# Patient Record
(demographics unavailable — no encounter records)

---
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
## 2025-06-10 NOTE — PHYSICAL EXAM
[4 x 4] : 4 x 4  [Abdominal Pad] : Abdominal Pad [JVD] : no jugular venous distention  [Normal Thyroid] : the thyroid was normal [Normal Heart Sounds] : normal heart sounds [Normal Breath Sounds] : Normal breath sounds [Normal Rate and Rhythm] : normal rate and rhythm [Ankle Swelling (On Exam)] : present [Ankle Swelling On The Right] : mild [Abdomen Masses] : No abdominal massess [Tender] : nontender [Abdomen Tenderness] : ~T ~M No abdominal tenderness [Enlarged] : not enlarged [Alert] : alert [Oriented to Person] : oriented to person [Oriented to Time] : oriented to time [Oriented to Place] : oriented to place [Calm] : calm [de-identified] : adult WM, NAD, alert, Ox3. [FreeTextEntry1] : Right Leg proximal [FreeTextEntry2] : 8.2cm [FreeTextEntry4] : 0.1cm [FreeTextEntry3] : 7.8cm [de-identified] : Sanguinous  [de-identified] : Burn 6/5/25 [de-identified] : Defined margins with residual blistered skin  [de-identified] : Patient expressed comfort post application, Circ/Neuromuscular function WNL  [de-identified] : Silvadene [de-identified] :  Mechanically cleansed with NS 0.9%, Sterile Gauze  Kerlix [FreeTextEntry7] : Right Leg Distal Fluid filled Blister [FreeTextEntry9] : 7.1cm [FreeTextEntry8] : 4.5cm [de-identified] : Burn 6/5/25 [de-identified] : Patient expressed comfort post application, Circ/Neuromuscular function WNL  [de-identified] : No product [de-identified] :  Mechanically cleansed with NS 0.9%, Sterile Gauze  Kerlix [TWNoteComboBox3] : FT [TWNoteComboBox4] : Small [TWNoteComboBox5] : No [TWNoteComboBox6] : Diabetic [de-identified] : None [de-identified] : No [de-identified] : None [de-identified] : No [de-identified] : 100% [de-identified] : Ace wraps [de-identified] : Primary Dressing [de-identified] : Daily [de-identified] : None [de-identified] : No [de-identified] : Traumatic [de-identified] : Normal [de-identified] : No [de-identified] : None [de-identified] : Ace wraps [de-identified] : Daily [de-identified] : Primary Dressing

## 2025-06-10 NOTE — PLAN
[FreeTextEntry1] : ABHISHEK jackson qd f/u-1 wk get authorization for sharp debridement  time spent 30 mins.

## 2025-06-10 NOTE — ASSESSMENT
[Verbal] : Verbal [Written] : Written [Demo] : Demo [Patient] : Patient [Good - alert, interested, motivated] : Good - alert, interested, motivated [Verbalizes knowledge/Understanding] : Verbalizes knowledge/understanding [Dressing changes] : dressing changes [Skin Care] : skin care [Signs and symptoms of infection] : sign and symptoms of infection [Nutrition] : nutrition [How and When to Call] : how and when to call [Pain Management] : pain management [Patient responsibility to plan of care] : patient responsibility to plan of care [] : Yes [Stable] : stable [Home] : Home [Ambulatory] : Ambulatory [Not Applicable - Long Term Care/Home Health Agency] : Long Term Care/Home Health Agency: Not Applicable [Compression Therapy] : compression therapy [FreeTextEntry2] : Infection prevention wound care Maintain optimal Skin Integrity to high pressure areas. Compression therapy Nutrition and Wound Healing Elevation and low sodium compliance Pressure relief/Pressure redistribution     [FreeTextEntry4] : Patient will come to [facility] for dressing changes. Follow up daily for dressings changes, 1 week for an assessment. [FreeTextEntry3] : Initial Visit

## 2025-06-24 NOTE — ASSESSMENT
[Verbal] : Verbal [Written] : Written [Demo] : Demo [Patient] : Patient [Good - alert, interested, motivated] : Good - alert, interested, motivated [Verbalizes knowledge/Understanding] : Verbalizes knowledge/understanding [Dressing changes] : dressing changes [Skin Care] : skin care [Signs and symptoms of infection] : sign and symptoms of infection [Nutrition] : nutrition [How and When to Call] : how and when to call [Pain Management] : pain management [Compression Therapy] : compression therapy [Patient responsibility to plan of care] : patient responsibility to plan of care [Stable] : stable [Home] : Home [Ambulatory] : Ambulatory [Not Applicable - Long Term Care/Home Health Agency] : Long Term Care/Home Health Agency: Not Applicable [] : Yes [FreeTextEntry2] : Infection prevention wound care Maintain optimal Skin Integrity to high pressure areas. Compression therapy Nutrition and Wound Healing Elevation and low sodium compliance Pressure relief/Pressure redistribution     [FreeTextEntry4] : Patient will come to [facility] for dressing changes Follow up daily for dressings changes, 1 week for an assessment.
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
## 2025-07-01 NOTE — PHYSICAL EXAM
[4 x 4] : 4 x 4  [Abdominal Pad] : Abdominal Pad [JVD] : no jugular venous distention  [Normal Thyroid] : the thyroid was normal [Normal Breath Sounds] : Normal breath sounds [Normal Heart Sounds] : normal heart sounds [Normal Rate and Rhythm] : normal rate and rhythm [Abdomen Masses] : No abdominal massess [Abdomen Tenderness] : ~T ~M No abdominal tenderness [Tender] : nontender [Enlarged] : not enlarged [Alert] : alert [Oriented to Person] : oriented to person [Oriented to Place] : oriented to place [Oriented to Time] : oriented to time [Calm] : calm [de-identified] : adult Wm, NAD, alert, Ox3. [FreeTextEntry1] : Right Leg Proximal (includes wound measurement) [FreeTextEntry2] : 15.0 [FreeTextEntry3] : 9.2 [FreeTextEntry4] : 0.1 [de-identified] : Moderate Serosanguinous  [de-identified] : Burn 6/5/25 [de-identified] : 50% [de-identified] : 40% [de-identified] : 10% [de-identified] : Santyl [de-identified] : Mechanically cleansed with sterile gauze and 0.9% normal saline. Dry Dressing Kerlix  *ACE wrap to secure dressing only, not for compression  Wound measurements are 6.3 x 10.8 x 0.1 [FreeTextEntry7] : Right Leg Distal - New Epithelial [FreeTextEntry8] : 5.0 [FreeTextEntry9] : 6.5 [de-identified] : 0.1 [de-identified] : Burn 6/5/25 [de-identified] : 1521 hrs [de-identified] : No Product [de-identified] : Mechanically cleansed with sterile gauze and 0.9% normal saline. Dry Dressing Kerlix  *ACE wrap to secure dressing only, not for compression   [TWNoteComboBox3] : FT [TWNoteComboBox5] : No [TWNoteComboBox6] : Diabetic [de-identified] : No [de-identified] : Normal [de-identified] : None [de-identified] : False [de-identified] : Daily [de-identified] : Primary Dressing [de-identified] : None [de-identified] : No [de-identified] : Traumatic [de-identified] : No [de-identified] : Normal [de-identified] : None [de-identified] : None [de-identified] : 2.5% Lidocaine Topical [de-identified] : False [de-identified] : Daily [de-identified] : Primary Dressing
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
## 2025-07-01 NOTE — VITALS
[Pain related to present condition?] : The patient's  pain is related to present condition. [Burning] : burning [Aching] : aching [] : No [de-identified] : 9/10 "stinging" [FreeTextEntry3] : Right Proximal Wound [FreeTextEntry1] : Motrin [FreeTextEntry2] : Dressing Changes [FreeTextEntry4] : Offloading
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
## 2025-07-18 NOTE — PHYSICAL EXAM
[4 x 4] : 4 x 4  [Abdominal Pad] : Abdominal Pad [JVD] : no jugular venous distention  [Abdomen Masses] : No abdominal massess [Abdomen Tenderness] : ~T ~M No abdominal tenderness [Tender] : nontender [Enlarged] : not enlarged [de-identified] : adult WM, NAD, alert, Ox3. [FreeTextEntry1] : Right Leg- Proximal  [FreeTextEntry2] : 5.8 [FreeTextEntry3] : 10.7 [FreeTextEntry4] : 0.1 [de-identified] : Moderate Serosanguinous  [de-identified] : Burn 6/5/25 [de-identified] : fragile epithelial tissue [de-identified] : 60% moist granulation [de-identified] : 40% firmly adherent  [de-identified] : silver alginate  [de-identified] : Mechanically cleansed with sterile gauze and 0.9% normal saline. Dry Dressing Kerlix  *ACE wrap to secure dressing only, not for compression  [TWNoteComboBox3] : FT [TWNoteComboBox5] : No [TWNoteComboBox6] : Diabetic [de-identified] : No [de-identified] : other [de-identified] : None [de-identified] : False [de-identified] : Every other day [de-identified] : Primary Dressing [de-identified] : False [de-identified] : False [de-identified] : False [de-identified] : False [de-identified] : False [de-identified] : False [de-identified] : False [de-identified] : False [de-identified] : False [de-identified] : False
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
## 2025-07-18 NOTE — ASSESSMENT
[Verbal] : Verbal [Demo] : Demo [Patient] : Patient [Good - alert, interested, motivated] : Good - alert, interested, motivated [Verbalizes knowledge/Understanding] : Verbalizes knowledge/understanding [Dressing changes] : dressing changes [Skin Care] : skin care [Signs and symptoms of infection] : sign and symptoms of infection [Nutrition] : nutrition [How and When to Call] : how and when to call [Pain Management] : pain management [Compression Therapy] : compression therapy [Patient responsibility to plan of care] : patient responsibility to plan of care [Stable] : stable [Home] : Home [Ambulatory] : Ambulatory [Not Applicable - Long Term Care/Home Health Agency] : Long Term Care/Home Health Agency: Not Applicable [] : Yes [FreeTextEntry2] : Infection prevention wound care Maintain optimal Skin Integrity to high pressure areas. Nutrition and Wound Healing Elevation and low sodium compliance Pressure relief/Pressure redistribution     [FreeTextEntry4] : 2 weeks for an assessment

## 2025-07-18 NOTE — PHYSICAL EXAM
[4 x 4] : 4 x 4  [Abdominal Pad] : Abdominal Pad [JVD] : no jugular venous distention  [Abdomen Masses] : No abdominal massess [Abdomen Tenderness] : ~T ~M No abdominal tenderness [Tender] : nontender [Enlarged] : not enlarged [de-identified] : adult WM, NAD, alert, Ox3. [FreeTextEntry1] : Right Leg- Proximal  [FreeTextEntry2] : 5.8 [FreeTextEntry3] : 10.7 [FreeTextEntry4] : 0.1 [de-identified] : Moderate Serosanguinous  [de-identified] : Burn 6/5/25 [de-identified] : fragile epithelial tissue [de-identified] : 60% moist granulation [de-identified] : 40% firmly adherent  [de-identified] : silver alginate  [de-identified] : Mechanically cleansed with sterile gauze and 0.9% normal saline. Dry Dressing Kerlix  *ACE wrap to secure dressing only, not for compression  [TWNoteComboBox3] : FT [TWNoteComboBox5] : No [TWNoteComboBox6] : Diabetic [de-identified] : No [de-identified] : other [de-identified] : None [de-identified] : False [de-identified] : Every other day [de-identified] : Primary Dressing [de-identified] : False [de-identified] : False [de-identified] : False [de-identified] : False [de-identified] : False [de-identified] : False [de-identified] : False [de-identified] : False [de-identified] : False [de-identified] : False
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
## 2025-07-22 NOTE — VITALS
[Pain related to present condition?] : The patient's  pain is not related to present condition. [] : No [de-identified] : 0/10

## 2025-07-22 NOTE — VITALS
[Pain related to present condition?] : The patient's  pain is not related to present condition. [] : No [de-identified] : 0/10
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
## 2025-07-22 NOTE — PLAN
[FreeTextEntry1] : change to [facility], adaptic, DD,abd pad 3x a wk f/u 2 wks gets dsg changes here 3x a wk  time spent-25 mins.
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
## 2025-07-22 NOTE — ASSESSMENT
[Verbal] : Verbal [Demo] : Demo [Patient] : Patient [Good - alert, interested, motivated] : Good - alert, interested, motivated [Verbalizes knowledge/Understanding] : Verbalizes knowledge/understanding [Dressing changes] : dressing changes [Skin Care] : skin care [Signs and symptoms of infection] : sign and symptoms of infection [Nutrition] : nutrition [How and When to Call] : how and when to call [Pain Management] : pain management [Compression Therapy] : compression therapy [Patient responsibility to plan of care] : patient responsibility to plan of care [] : Yes [FreeTextEntry2] : Infection prevention wound care Maintain optimal Skin Integrity to high pressure areas. Nutrition and Wound Healing Elevation and low sodium compliance Pressure relief/Pressure redistribution [FreeTextEntry4] : 2 weeks for an assessment  Patient verbalized understanding of all discussed. Patient refused copy of wound care instructions.  [Stable] : stable [Home] : Home [Ambulatory] : Ambulatory [Not Applicable - Long Term Care/Home Health Agency] : Long Term Care/Home Health Agency: Not Applicable and Cannabis Dependency  check Urine toxicology  Rehab advised  increase Coping Skills & Counslling

## 2025-07-22 NOTE — ASSESSMENT
[Verbal] : Verbal [Demo] : Demo [Patient] : Patient [Good - alert, interested, motivated] : Good - alert, interested, motivated [Verbalizes knowledge/Understanding] : Verbalizes knowledge/understanding [Dressing changes] : dressing changes [Skin Care] : skin care [Signs and symptoms of infection] : sign and symptoms of infection [Nutrition] : nutrition [How and When to Call] : how and when to call [Pain Management] : pain management [Compression Therapy] : compression therapy [Patient responsibility to plan of care] : patient responsibility to plan of care [] : Yes [FreeTextEntry2] : Infection prevention wound care Maintain optimal Skin Integrity to high pressure areas. Nutrition and Wound Healing Elevation and low sodium compliance Pressure relief/Pressure redistribution [FreeTextEntry4] : 2 weeks for an assessment  Patient verbalized understanding of all discussed. Patient refused copy of wound care instructions.  [Stable] : stable [Home] : Home [Ambulatory] : Ambulatory [Not Applicable - Long Term Care/Home Health Agency] : Long Term Care/Home Health Agency: Not Applicable